# Patient Record
Sex: FEMALE | Race: WHITE | NOT HISPANIC OR LATINO
[De-identification: names, ages, dates, MRNs, and addresses within clinical notes are randomized per-mention and may not be internally consistent; named-entity substitution may affect disease eponyms.]

---

## 2024-03-11 PROBLEM — Z00.00 ENCOUNTER FOR PREVENTIVE HEALTH EXAMINATION: Status: ACTIVE | Noted: 2024-03-11

## 2024-03-13 ENCOUNTER — APPOINTMENT (OUTPATIENT)
Dept: GYNECOLOGIC ONCOLOGY | Facility: CLINIC | Age: 84
End: 2024-03-13
Payer: MEDICARE

## 2024-03-13 VITALS
DIASTOLIC BLOOD PRESSURE: 73 MMHG | BODY MASS INDEX: 21.17 KG/M2 | WEIGHT: 105 LBS | HEART RATE: 97 BPM | SYSTOLIC BLOOD PRESSURE: 147 MMHG | HEIGHT: 59 IN | OXYGEN SATURATION: 100 %

## 2024-03-13 DIAGNOSIS — K86.2 CYST OF PANCREAS: ICD-10-CM

## 2024-03-13 DIAGNOSIS — Z86.39 PERSONAL HISTORY OF OTHER ENDOCRINE, NUTRITIONAL AND METABOLIC DISEASE: ICD-10-CM

## 2024-03-13 DIAGNOSIS — Z63.4 DISAPPEARANCE AND DEATH OF FAMILY MEMBER: ICD-10-CM

## 2024-03-13 DIAGNOSIS — Z80.0 FAMILY HISTORY OF MALIGNANT NEOPLASM OF DIGESTIVE ORGANS: ICD-10-CM

## 2024-03-13 DIAGNOSIS — Z80.2 FAMILY HISTORY OF MALIGNANT NEOPLASM OF OTHER RESPIRATORY AND INTRATHORACIC ORGANS: ICD-10-CM

## 2024-03-13 DIAGNOSIS — Z78.9 OTHER SPECIFIED HEALTH STATUS: ICD-10-CM

## 2024-03-13 PROCEDURE — 99205 OFFICE O/P NEW HI 60 MIN: CPT

## 2024-03-13 RX ORDER — DULOXETINE HYDROCHLORIDE 60 MG/1
60 CAPSULE, DELAYED RELEASE ORAL
Refills: 0 | Status: ACTIVE | COMMUNITY

## 2024-03-13 RX ORDER — TOPIRAMATE 50 MG/1
TABLET, FILM COATED ORAL
Refills: 0 | Status: ACTIVE | COMMUNITY

## 2024-03-13 RX ORDER — LAMOTRIGINE 150 MG/1
TABLET ORAL
Refills: 0 | Status: ACTIVE | COMMUNITY

## 2024-03-13 RX ORDER — LEVOTHYROXINE SODIUM 137 UG/1
TABLET ORAL
Refills: 0 | Status: ACTIVE | COMMUNITY

## 2024-03-13 RX ORDER — CYCLOSPORINE 0.5 MG/ML
EMULSION OPHTHALMIC
Refills: 0 | Status: ACTIVE | COMMUNITY

## 2024-03-13 SDOH — SOCIAL STABILITY - SOCIAL INSECURITY: DISSAPEARANCE AND DEATH OF FAMILY MEMBER: Z63.4

## 2024-03-13 NOTE — HISTORY OF PRESENT ILLNESS
[FreeTextEntry1] : 82yo P4  LMP late 40's  referred for pelvic pain and thickened EE on sono. Pt admits to not seeing GYN in >5yrs and noticed some pelvic discomfort, vaginal discharge and questionable vaginal bleeding vs rectal bleeding over the past 6mths. She established care with Dr Golden(GYN) and had limited exam due to severe vaginal atrophy but was noted to have prolapsing hemorrhoids. She was referred to Dr Foss whom she saw 1 week ago and was placed on a regimen which has alleviated some of her discomfort and bleeding. She denies prior episodes of PMB. denies n/v/fever/bleeding/bloating. Reports normal urination and BMs.   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 (surg onc), hx of sbo  PSHx: total knee  (Dr Fonseca) c/b bleeding requiring transfusion, [facility] 2018 Dr Terrazas OBGYNHx:  x4, PPH x1, denies hx of fibroids/cysts/abn paps/stis FamHx: father with colon ca diagnosed aprox age 60, demies due to disease age 63yo, brother with nasal cavity ca and demise age 70s, denies hx of gyn ca, breast ca SocHx: denies x3 All: NKDA   mammogram: last done 2-3yrs ago WNL per pt report, screening no longer required due to age colonoscopy: 5-6yrs ago, WNL per pt report, screening no longer required due to age   Labs:  24 vaginitis panel: negative   Imaging:  Pelvic ultrasound 2024: Transabdominal imaging of the pelvis reveals uterus measures 6 x 4.7 x 3.0 cm.  There is a fluid-filled endometrial canal, which also contains echo poor tissue; measuring up to 1.3 cm in AP diameter.  Some internal vascularity in the endometrium is present. Neither the left nor the right ovary is seen. Impression: Fluid-filled and more echo poor, solid components are present within the endometrial canal.  There is some internal vascularity within the endometrium. Both a 1.3 cm AP diameter thickness, and the endometrium approximately 0.5 cm appears to be fluid and remainder semisolid.  Endometrial neoplasia is not excluded on the basis of the study.  Neither ovary is sonographically visualized.  Further assessment with pelvic MRI may be considered pre and postcontrast to more accurately assess the endometrium.  There is no free fluid seen in the cul-de-sac.

## 2024-03-13 NOTE — REVIEW OF SYSTEMS
[FreeTextEntry1] : Differential dx reviewed.  Pt aware vaginal culture preformed.  PT aware exam/appearance of genital area all normal.  Pt will obtain recent TVS from [facility] for this practice.  PT scheduling annual, overdue.  All the pt's questions and concerns were addressed. 
[Negative] : Musculoskeletal

## 2024-03-13 NOTE — DISCUSSION/SUMMARY
[Reviewed Clinical Lab Test(s)] : Results of clinical tests were reviewed. [Reviewed Radiology Report(s)] : Radiology reports were reviewed. [Discuss Alternatives/Risks/Benefits w/Patient] : All alternatives, risks, and benefits were discussed with the patient/family and all questions were answered.  Patient expressed good understanding and appreciates the importance of follow up as recommended. [Visit Time ___ Minutes] : [unfilled] minutes [Face to Face Time___ Minutes] : with [unfilled] minutes in face to face consultation. [FreeTextEntry1] : 84yo w/ thickened EE on sono found during work up of vaginal bleeding. Patient also with hemorrhoids and now that hemorrhoids controlled no further bleeding. Patient intolerant of in office exam/ embx. For further sampling of endometrium. -more than 50% of visit spent face to face with patient reviewing records and interpreting imaging/path/lab results, counseling and coordinating care -reviewed thickened EE and benign vs pre cancer vs cancer etiologies. I recommended sampling of EE with D&C/myosure hsc. Patient's GYN not comfortable doing this procedure given concern for atrophy and stenosis therefore I will plan procedure. -Surgical booking: eua/ d&c myosure [facility] [place] with sono guidance -ERAS, pre-op clearance at PST, labs, covid testing as pre protocol -risk, benefits reviewed with patient regarding above described procedure.  Reviewed risks of procedure not limited to infection, bleeding, injury to surrounding structures, less than 1% risk of uterine perforation, VTE, heart and lung complications. all questions answered and patient expressed understanding -f/u post-op

## 2024-04-02 ENCOUNTER — RESULT REVIEW (OUTPATIENT)
Age: 84
End: 2024-04-02

## 2024-04-02 ENCOUNTER — APPOINTMENT (OUTPATIENT)
Dept: GYNECOLOGIC ONCOLOGY | Facility: HOSPITAL | Age: 84
End: 2024-04-02

## 2024-04-02 ENCOUNTER — TRANSCRIPTION ENCOUNTER (OUTPATIENT)
Age: 84
End: 2024-04-02

## 2024-04-11 ENCOUNTER — APPOINTMENT (OUTPATIENT)
Dept: GYNECOLOGIC ONCOLOGY | Facility: CLINIC | Age: 84
End: 2024-04-11
Payer: MEDICARE

## 2024-04-11 DIAGNOSIS — R93.89 ABNORMAL FINDINGS ON DIAGNOSTIC IMAGING OF OTHER SPECIFIED BODY STRUCTURES: ICD-10-CM

## 2024-04-11 PROCEDURE — 99441: CPT | Mod: 93

## 2024-04-16 ENCOUNTER — APPOINTMENT (OUTPATIENT)
Dept: GYNECOLOGIC ONCOLOGY | Facility: CLINIC | Age: 84
End: 2024-04-16